# Patient Record
Sex: MALE | Race: WHITE | NOT HISPANIC OR LATINO | Employment: UNEMPLOYED | URBAN - NONMETROPOLITAN AREA
[De-identification: names, ages, dates, MRNs, and addresses within clinical notes are randomized per-mention and may not be internally consistent; named-entity substitution may affect disease eponyms.]

---

## 2024-06-29 ENCOUNTER — HOSPITAL ENCOUNTER (EMERGENCY)
Facility: HOSPITAL | Age: 44
Discharge: HOME | End: 2024-06-29
Attending: FAMILY MEDICINE
Payer: MEDICAID

## 2024-06-29 DIAGNOSIS — R52 PAIN: Primary | ICD-10-CM

## 2024-06-29 PROCEDURE — 99999 HC NO LEVEL OF SERVICE - ED ONLY: CPT

## 2024-06-29 PROCEDURE — 99283 EMERGENCY DEPT VISIT LOW MDM: CPT

## 2024-06-29 RX ORDER — ALUMINUM HYDROXIDE, MAGNESIUM HYDROXIDE, AND SIMETHICONE 1200; 120; 1200 MG/30ML; MG/30ML; MG/30ML
SUSPENSION ORAL
Status: DISPENSED
Start: 2024-06-29 | End: 2024-06-29

## 2024-06-29 RX ORDER — LIDOCAINE HYDROCHLORIDE 20 MG/ML
SOLUTION OROPHARYNGEAL
Status: DISPENSED
Start: 2024-06-29 | End: 2024-06-29

## 2024-09-01 NOTE — ED PROVIDER NOTES
Encounter Date: 6/29/2024  Patient left AMA.     History   No chief complaint on file.    HPI  Review of patient's allergies indicates:  Not on File  No past medical history on file.  No past surgical history on file.  No family history on file.     Review of Systems    Physical Exam     Initial Vitals   BP Pulse Resp Temp SpO2   -- -- -- -- --      MAP       --         Physical Exam    ED Course   Procedures  Labs Reviewed - No data to display       Imaging Results    None          Medications   LIDOcaine viscous HCl 2% (XYLOCAINE) 2 % oral solution (has no administration in time range)   aluminum-magnesium hydroxide-simethicone (MAALOX) 200-200-20 mg/5 mL suspension (has no administration in time range)     Medical Decision Making                         Patient left AMA.           Clinical Impression:  Final diagnoses:  [R52] Pain (Primary)          ED Disposition Condition    AMA Stable                Angelito Jason MD  09/01/24 2527

## 2025-01-23 ENCOUNTER — HOSPITAL ENCOUNTER (EMERGENCY)
Facility: HOSPITAL | Age: 45
Discharge: HOME | End: 2025-01-23

## 2025-01-23 VITALS
RESPIRATION RATE: 18 BRPM | SYSTOLIC BLOOD PRESSURE: 141 MMHG | WEIGHT: 168 LBS | HEIGHT: 65 IN | DIASTOLIC BLOOD PRESSURE: 75 MMHG | HEART RATE: 94 BPM | OXYGEN SATURATION: 99 % | BODY MASS INDEX: 27.99 KG/M2 | TEMPERATURE: 98 F

## 2025-01-23 DIAGNOSIS — S60.00XA FINGERTIP CONTUSION, INITIAL ENCOUNTER: Primary | ICD-10-CM

## 2025-01-23 PROCEDURE — 99283 EMERGENCY DEPT VISIT LOW MDM: CPT | Mod: 25

## 2025-01-23 PROCEDURE — 25000003 PHARM REV CODE 250

## 2025-01-23 RX ORDER — NAPROXEN 500 MG/1
500 TABLET ORAL 2 TIMES DAILY
Qty: 20 TABLET | Refills: 0 | Status: SHIPPED | OUTPATIENT
Start: 2025-01-23

## 2025-01-23 RX ORDER — IBUPROFEN 600 MG/1
600 TABLET ORAL
Status: COMPLETED | OUTPATIENT
Start: 2025-01-23 | End: 2025-01-23

## 2025-01-23 RX ORDER — ACETAMINOPHEN 500 MG
1000 TABLET ORAL
Status: COMPLETED | OUTPATIENT
Start: 2025-01-23 | End: 2025-01-23

## 2025-01-23 RX ADMIN — ACETAMINOPHEN 1000 MG: 500 TABLET, FILM COATED ORAL at 11:01

## 2025-01-23 RX ADMIN — IBUPROFEN 600 MG: 600 TABLET, FILM COATED ORAL at 11:01

## 2025-01-24 NOTE — ED PROVIDER NOTES
Encounter Date: 1/23/2025       History     Chief Complaint   Patient presents with    Hand Pain     Arrives with c/o (L) index finger pain and swelling after falling off of chair.      44-year-old male presents complaining of left fingertip pain, after a crush injury 3 days ago.    The history is provided by the patient.     Review of patient's allergies indicates:  No Known Allergies  History reviewed. No pertinent past medical history.  History reviewed. No pertinent surgical history.  No family history on file.     Review of Systems   Constitutional:  Negative for fever.   HENT:  Negative for sore throat.    Respiratory:  Negative for shortness of breath.    Cardiovascular:  Negative for chest pain.   Gastrointestinal:  Negative for nausea.   Genitourinary:  Negative for dysuria.   Musculoskeletal:  Negative for back pain.        Pain at the end of the left index finger   Skin:  Negative for rash.   Neurological:  Negative for weakness.   Hematological:  Does not bruise/bleed easily.   All other systems reviewed and are negative.      Physical Exam     Initial Vitals [01/23/25 2150]   BP Pulse Resp Temp SpO2   (!) 141/75 94 18 98.3 °F (36.8 °C) 99 %      MAP       --         Physical Exam    Nursing note and vitals reviewed.  Constitutional: Vital signs are normal. He appears well-developed and well-nourished. He is cooperative.   HENT:   Head: Normocephalic and atraumatic. Mouth/Throat: Oropharynx is clear and moist.   Eyes: Conjunctivae, EOM and lids are normal. Pupils are equal, round, and reactive to light.   Neck: Trachea normal. Neck supple.   Normal range of motion.  Cardiovascular:  Normal rate, regular rhythm, normal heart sounds and intact distal pulses.           Pulmonary/Chest: Breath sounds normal.   Musculoskeletal:         General: Tenderness present. Normal range of motion.      Cervical back: Normal, normal range of motion and neck supple.      Lumbar back: Normal.      Comments: That has  tenderness over the left fingertip.  There is a subungual hematoma.     Neurological: He is alert and oriented to person, place, and time. He has normal strength. Coordination normal.   Skin: Skin is warm, dry and intact. Capillary refill takes less than 2 seconds.   Psychiatric: He has a normal mood and affect. His speech is normal and behavior is normal. Judgment and thought content normal. Cognition and memory are normal.         ED Course   Procedures  Labs Reviewed - No data to display       Imaging Results              X-Ray Finger 2 or More Views Left (Preliminary result)  Result time 01/23/25 22:16:15      Wet Read by Quang Moreno MD (01/23/25 22:16:15, [facility], Emergency Medicine)    No fracture, normal alignment                                     Medications   acetaminophen tablet 1,000 mg (has no administration in time range)   ibuprofen tablet 600 mg (has no administration in time range)     Medical Decision Making  Left fingertip crush injury  Differential diagnosis: Fracture versus contusion  X-ray  Tylenol/ibuprofen    Amount and/or Complexity of Data Reviewed  Radiology: ordered and independent interpretation performed.    Risk  OTC drugs.  Prescription drug management.                                      Clinical Impression:  Final diagnoses:  [S60.00XA] Fingertip contusion, initial encounter (Primary)          ED Disposition Condition    Discharge Good          ED Prescriptions       Medication Sig Dispense Start Date End Date Auth. Provider    naproxen (NAPROSYN) 500 MG tablet Take 1 tablet (500 mg total) by mouth 2 (two) times daily. 20 tablet 1/23/2025 -- Quang Moreno MD          Follow-up Information       Follow up With Specialties Details Why Contact Info    PCP  Call in 1 day               Quang Moreno MD  01/23/25 6312